# Patient Record
Sex: MALE | Race: WHITE | NOT HISPANIC OR LATINO
[De-identification: names, ages, dates, MRNs, and addresses within clinical notes are randomized per-mention and may not be internally consistent; named-entity substitution may affect disease eponyms.]

---

## 2019-07-10 PROBLEM — Z00.00 ENCOUNTER FOR PREVENTIVE HEALTH EXAMINATION: Status: ACTIVE | Noted: 2019-07-10

## 2019-07-15 ENCOUNTER — APPOINTMENT (OUTPATIENT)
Dept: OTOLARYNGOLOGY | Facility: CLINIC | Age: 49
End: 2019-07-15
Payer: MEDICARE

## 2019-07-15 VITALS
HEART RATE: 108 BPM | SYSTOLIC BLOOD PRESSURE: 104 MMHG | BODY MASS INDEX: 23.7 KG/M2 | DIASTOLIC BLOOD PRESSURE: 73 MMHG | WEIGHT: 175 LBS | HEIGHT: 72 IN

## 2019-07-15 DIAGNOSIS — R59.1 GENERALIZED ENLARGED LYMPH NODES: ICD-10-CM

## 2019-07-15 DIAGNOSIS — R22.0 LOCALIZED SWELLING, MASS AND LUMP, HEAD: ICD-10-CM

## 2019-07-15 DIAGNOSIS — R22.1 LOCALIZED SWELLING, MASS AND LUMP, HEAD: ICD-10-CM

## 2019-07-15 PROCEDURE — 99204 OFFICE O/P NEW MOD 45 MIN: CPT | Mod: 25

## 2019-07-15 PROCEDURE — 31575 DIAGNOSTIC LARYNGOSCOPY: CPT

## 2019-07-15 RX ORDER — GABAPENTIN 250 MG/5ML
300 SOLUTION ORAL
Refills: 0 | Status: ACTIVE | COMMUNITY

## 2019-07-15 RX ORDER — AMITRIPTYLINE HYDROCHLORIDE 10 MG/1
10 TABLET, FILM COATED ORAL
Refills: 0 | Status: ACTIVE | COMMUNITY

## 2019-07-15 RX ORDER — MORPHINE SULFATE 30 MG
30 TABLET, HYPODERMIC INJECTION
Refills: 0 | Status: ACTIVE | COMMUNITY

## 2019-07-15 NOTE — CONSULT LETTER
[FreeTextEntry2] : all call [FreeTextEntry1] : Dear Dr. SHASHI HIDALGO,\par \par Thank you for your kind referral. Please refer to my enclosed office notes for EDWARD HERREL . If there are any questions free to contact me.\par \par  [FreeTextEntry3] : Guy Tavares MD, FACS\par

## 2019-07-15 NOTE — REVIEW OF SYSTEMS
[Swelling Neck] : swelling neck [Swelling Face] : face swelling [Negative] : Heme/Lymph [Patient Intake Form Reviewed] : Patient intake form was reviewed [FreeTextEntry1] : strawberry birthmarks

## 2019-07-15 NOTE — REASON FOR VISIT
[Initial Consultation] : an initial consultation for [FreeTextEntry2] : lump in throat, changes in size

## 2019-07-15 NOTE — PROCEDURE
[de-identified] : Indication for procedure:Unable to examine laryngeal structures with mirror exam\par Scope # 4\par Topical anesthesia with viscous xylocaine 2% is applied to the anterior nares.\par A flexible fiberoptic laryngoscope is than introduced through the nares.\par The nasopharynx is clear without mass or inflammation.\par The posterior pharyngeal wall is unremarkable.\par The tongue base and vallecula are unremarkable.  The hypopharynx is unremarkable and unobstructed.\par The supraglottic larynx is within normal limits.\par Both vocal cords are fully mobile with no nodule, polyp or other lesion present.\par There is no edema or erythema overlying the arytenoid cartilages or the inter-arytenoid space.\par The subglottic space is clear.\par The voice has a normal quality.\par

## 2019-07-15 NOTE — ASSESSMENT
[FreeTextEntry1] : rt neck mass\par fluctuating in size by hx\par consistent w branchial cleft cyst\par ct neck

## 2019-07-15 NOTE — PHYSICAL EXAM
[Midline] : trachea located in midline position [Laryngoscopy Performed] : laryngoscopy was performed, see procedure section for findings [Normal] : no rashes [de-identified] : rt 4 cm soft discrete mass adjacent to rt scm andlateral to thyroid cartilage [de-identified] : trent cleared au

## 2019-07-15 NOTE — HISTORY OF PRESENT ILLNESS
[de-identified] : co lump neck noted several mo ago\par no pain\par to primary trial antibiotics, now fluctuating in size quite a bit  90% than relapse\par no hoarseness no dysphagia

## 2019-07-17 ENCOUNTER — APPOINTMENT (OUTPATIENT)
Dept: CT IMAGING | Facility: CLINIC | Age: 49
End: 2019-07-17

## 2019-07-17 ENCOUNTER — FORM ENCOUNTER (OUTPATIENT)
Age: 49
End: 2019-07-17

## 2019-07-18 ENCOUNTER — OUTPATIENT (OUTPATIENT)
Dept: OUTPATIENT SERVICES | Facility: HOSPITAL | Age: 49
LOS: 1 days | End: 2019-07-18
Payer: MEDICARE

## 2019-07-18 ENCOUNTER — APPOINTMENT (OUTPATIENT)
Dept: CT IMAGING | Facility: CLINIC | Age: 49
End: 2019-07-18

## 2019-07-18 ENCOUNTER — APPOINTMENT (OUTPATIENT)
Dept: CT IMAGING | Facility: CLINIC | Age: 49
End: 2019-07-18
Payer: MEDICARE

## 2019-07-18 DIAGNOSIS — Z98.1 ARTHRODESIS STATUS: Chronic | ICD-10-CM

## 2019-07-18 DIAGNOSIS — Z98.890 OTHER SPECIFIED POSTPROCEDURAL STATES: Chronic | ICD-10-CM

## 2019-07-18 DIAGNOSIS — Z00.8 ENCOUNTER FOR OTHER GENERAL EXAMINATION: ICD-10-CM

## 2019-07-18 PROCEDURE — 70491 CT SOFT TISSUE NECK W/DYE: CPT

## 2019-07-18 PROCEDURE — 70491 CT SOFT TISSUE NECK W/DYE: CPT | Mod: 26

## 2019-07-30 ENCOUNTER — TRANSCRIPTION ENCOUNTER (OUTPATIENT)
Age: 49
End: 2019-07-30

## 2019-08-09 ENCOUNTER — APPOINTMENT (OUTPATIENT)
Dept: OTOLARYNGOLOGY | Facility: CLINIC | Age: 49
End: 2019-08-09
Payer: MEDICARE

## 2019-08-09 VITALS
SYSTOLIC BLOOD PRESSURE: 112 MMHG | WEIGHT: 175 LBS | BODY MASS INDEX: 23.7 KG/M2 | DIASTOLIC BLOOD PRESSURE: 73 MMHG | HEART RATE: 91 BPM | HEIGHT: 72 IN

## 2019-08-09 DIAGNOSIS — R22.1 LOCALIZED SWELLING, MASS AND LUMP, NECK: ICD-10-CM

## 2019-08-09 PROCEDURE — 31575 DIAGNOSTIC LARYNGOSCOPY: CPT

## 2019-08-09 PROCEDURE — 99214 OFFICE O/P EST MOD 30 MIN: CPT | Mod: 25

## 2019-08-09 NOTE — CONSULT LETTER
[FreeTextEntry1] : Dear Dr. Tavares,\par I had the pleasure of evaluating your patient MAGGI COBIAN  thank you for allowing us to participate in their care. please see full note detailing our visit below.\par If you have any questions, please do not hesitate to call me and I would be happy to discuss further. \par \par Imtiaz Gomez M.D.\par Attending Physician,  \par Department of Otolaryngology - Head and Neck Surgery\par [facility] \par Office: [phone]\par Fax: [phone]\par

## 2019-08-09 NOTE — PHYSICAL EXAM
[Midline] : trachea located in midline position [Normal] : no rashes [de-identified] : 4 cm right cystic  mass lvl 2, just anterior to SCm

## 2019-08-09 NOTE — HISTORY OF PRESENT ILLNESS
[de-identified] : 47 y/o male referred by Dr. Tavares with 6-7 month history of non-tender mass in right side of neck which continually fluctuates in size- from very small (kidney bean) to very large. CT neck 7/18/19 showed cystic soft tissue mass within right juxta hyoid neck- type 2 branchial cleft cyst. Hasn't been treated be any abx since this happened.\par \par No throat pain. No difficulty swallowing or breathing. No vocal changes. \par No fever or chills.

## 2019-08-09 NOTE — ASSESSMENT
[FreeTextEntry1] : pt with right neck cyst possible  right branchial cleft cysts \par \par pt with likely likely right brancial cleft cysts large swelling, would like to consider removal with procedure possible Direct laryngosocpy. discussed risks and benefits - bleeding infection, hurst in voice, injury to cranial nerves including marginal mandibular that moves corner of the mouth due to its location, also sitting on great vessels and vagus, injury to larynx and pharynx, airway swelling and compromise, scar, injury to teeth, salivary fistular, numbness swallowing difficulty, recurrence as it has been infected and even with trying to excise all of track. Questions were asked to gauge level of understanding of the risk and patient was clear and their questions were answered. After complete discussion they elected to proceed with operation.\par \par \par I personally saw and examined MAGGI COBIAN in detail. I spoke to BREANNE Ferrer regarding the assessment and plan of care.  I preformed the procedures and I reviewed the above assessment and plan of care, and agree. I have made changes in changes in the body of the note where appropriate.\par \par

## 2019-08-09 NOTE — PROCEDURE
[de-identified] : Procedure performed: laryngeal Endoscopy- Diagnostic\par Pre-op/post op indication: dysphonia\par Verbal and/or written consent obtained from patient, Patient was unable to cooperate with mirror\par Scope #: 3, flexible fiber optic telescope used \par Scope was introduced through the nose passed on the floor of the nose to the nasopharynx and then followed down the soft palate to the lower pharynx. The tongue Base, Larynx, Hypopharynx were examined. Base of tongue was symmetric, vallecular was clear, epiglottis was not deformed, subglottis/ pyriform and posterior pharyngeal walls were clear. No erythema, edema, pooling of secretions, masses or lesions. Airway patent, no foreign body visualized. No glottic/supraglottic edema. True vocal cords, arytenoids, vestibular folds, ventricles, pyriform sinuses, and aryepiglottic folds appear normal bilaterally. Vocal cords mobile with good contact b/l.\par \par

## 2019-10-09 ENCOUNTER — INPATIENT (INPATIENT)
Facility: HOSPITAL | Age: 49
LOS: 1 days | Discharge: HOME | DRG: 603 | End: 2019-10-11
Attending: HOSPITALIST | Admitting: HOSPITALIST
Payer: MEDICARE

## 2019-10-09 VITALS — HEIGHT: 72 IN | WEIGHT: 175.05 LBS

## 2019-10-09 DIAGNOSIS — L03.129 ACUTE LYMPHANGITIS OF UNSPECIFIED PART OF LIMB: ICD-10-CM

## 2019-10-09 DIAGNOSIS — Z98.1 ARTHRODESIS STATUS: Chronic | ICD-10-CM

## 2019-10-09 DIAGNOSIS — Z98.890 OTHER SPECIFIED POSTPROCEDURAL STATES: Chronic | ICD-10-CM

## 2019-10-09 LAB
ALBUMIN SERPL ELPH-MCNC: 4 G/DL — SIGNIFICANT CHANGE UP (ref 3.3–5)
ALP SERPL-CCNC: 71 U/L — SIGNIFICANT CHANGE UP (ref 40–120)
ALT FLD-CCNC: 28 U/L — SIGNIFICANT CHANGE UP (ref 12–78)
ANION GAP SERPL CALC-SCNC: 2 MMOL/L — LOW (ref 5–17)
APPEARANCE UR: CLEAR — SIGNIFICANT CHANGE UP
APTT BLD: 34.6 SEC — SIGNIFICANT CHANGE UP (ref 27.5–36.3)
AST SERPL-CCNC: 18 U/L — SIGNIFICANT CHANGE UP (ref 15–37)
BASOPHILS # BLD AUTO: 0.04 K/UL — SIGNIFICANT CHANGE UP (ref 0–0.2)
BASOPHILS NFR BLD AUTO: 0.4 % — SIGNIFICANT CHANGE UP (ref 0–2)
BILIRUB SERPL-MCNC: 0.4 MG/DL — SIGNIFICANT CHANGE UP (ref 0.2–1.2)
BILIRUB UR-MCNC: NEGATIVE — SIGNIFICANT CHANGE UP
BUN SERPL-MCNC: 14 MG/DL — SIGNIFICANT CHANGE UP (ref 7–23)
CALCIUM SERPL-MCNC: 8.9 MG/DL — SIGNIFICANT CHANGE UP (ref 8.5–10.1)
CHLORIDE SERPL-SCNC: 106 MMOL/L — SIGNIFICANT CHANGE UP (ref 96–108)
CO2 SERPL-SCNC: 32 MMOL/L — HIGH (ref 22–31)
COLOR SPEC: YELLOW — SIGNIFICANT CHANGE UP
CREAT SERPL-MCNC: 1.03 MG/DL — SIGNIFICANT CHANGE UP (ref 0.5–1.3)
DIFF PNL FLD: NEGATIVE — SIGNIFICANT CHANGE UP
EOSINOPHIL # BLD AUTO: 0.23 K/UL — SIGNIFICANT CHANGE UP (ref 0–0.5)
EOSINOPHIL NFR BLD AUTO: 2.5 % — SIGNIFICANT CHANGE UP (ref 0–6)
GLUCOSE SERPL-MCNC: 90 MG/DL — SIGNIFICANT CHANGE UP (ref 70–99)
GLUCOSE UR QL: NEGATIVE MG/DL — SIGNIFICANT CHANGE UP
HCT VFR BLD CALC: 48.2 % — SIGNIFICANT CHANGE UP (ref 39–50)
HGB BLD-MCNC: 16 G/DL — SIGNIFICANT CHANGE UP (ref 13–17)
IMM GRANULOCYTES NFR BLD AUTO: 0.2 % — SIGNIFICANT CHANGE UP (ref 0–1.5)
INR BLD: 1.09 RATIO — SIGNIFICANT CHANGE UP (ref 0.88–1.16)
KETONES UR-MCNC: ABNORMAL
LACTATE SERPL-SCNC: 0.7 MMOL/L — SIGNIFICANT CHANGE UP (ref 0.7–2)
LEUKOCYTE ESTERASE UR-ACNC: ABNORMAL
LYMPHOCYTES # BLD AUTO: 2.58 K/UL — SIGNIFICANT CHANGE UP (ref 1–3.3)
LYMPHOCYTES # BLD AUTO: 28.3 % — SIGNIFICANT CHANGE UP (ref 13–44)
MCHC RBC-ENTMCNC: 31.1 PG — SIGNIFICANT CHANGE UP (ref 27–34)
MCHC RBC-ENTMCNC: 33.2 GM/DL — SIGNIFICANT CHANGE UP (ref 32–36)
MCV RBC AUTO: 93.8 FL — SIGNIFICANT CHANGE UP (ref 80–100)
MONOCYTES # BLD AUTO: 0.87 K/UL — SIGNIFICANT CHANGE UP (ref 0–0.9)
MONOCYTES NFR BLD AUTO: 9.5 % — SIGNIFICANT CHANGE UP (ref 2–14)
NEUTROPHILS # BLD AUTO: 5.38 K/UL — SIGNIFICANT CHANGE UP (ref 1.8–7.4)
NEUTROPHILS NFR BLD AUTO: 59.1 % — SIGNIFICANT CHANGE UP (ref 43–77)
NITRITE UR-MCNC: NEGATIVE — SIGNIFICANT CHANGE UP
PH UR: 5 — SIGNIFICANT CHANGE UP (ref 5–8)
PLATELET # BLD AUTO: 237 K/UL — SIGNIFICANT CHANGE UP (ref 150–400)
POTASSIUM SERPL-MCNC: 3.8 MMOL/L — SIGNIFICANT CHANGE UP (ref 3.5–5.3)
POTASSIUM SERPL-SCNC: 3.8 MMOL/L — SIGNIFICANT CHANGE UP (ref 3.5–5.3)
PROT SERPL-MCNC: 7.8 GM/DL — SIGNIFICANT CHANGE UP (ref 6–8.3)
PROT UR-MCNC: NEGATIVE MG/DL — SIGNIFICANT CHANGE UP
PROTHROM AB SERPL-ACNC: 12.1 SEC — SIGNIFICANT CHANGE UP (ref 10–12.9)
RBC # BLD: 5.14 M/UL — SIGNIFICANT CHANGE UP (ref 4.2–5.8)
RBC # FLD: 13.8 % — SIGNIFICANT CHANGE UP (ref 10.3–14.5)
SODIUM SERPL-SCNC: 140 MMOL/L — SIGNIFICANT CHANGE UP (ref 135–145)
SP GR SPEC: 1.02 — SIGNIFICANT CHANGE UP (ref 1.01–1.02)
UROBILINOGEN FLD QL: NEGATIVE MG/DL — SIGNIFICANT CHANGE UP
WBC # BLD: 9.12 K/UL — SIGNIFICANT CHANGE UP (ref 3.8–10.5)
WBC # FLD AUTO: 9.12 K/UL — SIGNIFICANT CHANGE UP (ref 3.8–10.5)

## 2019-10-09 PROCEDURE — 71045 X-RAY EXAM CHEST 1 VIEW: CPT | Mod: 26

## 2019-10-09 PROCEDURE — 73718 MRI LOWER EXTREMITY W/O DYE: CPT | Mod: LT

## 2019-10-09 PROCEDURE — 80202 ASSAY OF VANCOMYCIN: CPT

## 2019-10-09 PROCEDURE — 73718 MRI LOWER EXTREMITY W/O DYE: CPT | Mod: 26,LT

## 2019-10-09 PROCEDURE — 93971 EXTREMITY STUDY: CPT | Mod: 26,LT

## 2019-10-09 PROCEDURE — 73721 MRI JNT OF LWR EXTRE W/O DYE: CPT | Mod: 26,LT

## 2019-10-09 PROCEDURE — 81001 URINALYSIS AUTO W/SCOPE: CPT

## 2019-10-09 PROCEDURE — 36415 COLL VENOUS BLD VENIPUNCTURE: CPT

## 2019-10-09 PROCEDURE — 73630 X-RAY EXAM OF FOOT: CPT | Mod: 26,LT

## 2019-10-09 PROCEDURE — 73721 MRI JNT OF LWR EXTRE W/O DYE: CPT | Mod: LT

## 2019-10-09 PROCEDURE — 87086 URINE CULTURE/COLONY COUNT: CPT

## 2019-10-09 RX ORDER — AMPICILLIN SODIUM AND SULBACTAM SODIUM 250; 125 MG/ML; MG/ML
3 INJECTION, POWDER, FOR SUSPENSION INTRAMUSCULAR; INTRAVENOUS ONCE
Refills: 0 | Status: COMPLETED | OUTPATIENT
Start: 2019-10-09 | End: 2019-10-09

## 2019-10-09 RX ORDER — ENOXAPARIN SODIUM 100 MG/ML
40 INJECTION SUBCUTANEOUS DAILY
Refills: 0 | Status: DISCONTINUED | OUTPATIENT
Start: 2019-10-09 | End: 2019-10-11

## 2019-10-09 RX ORDER — CEFAZOLIN SODIUM 1 G
1000 VIAL (EA) INJECTION ONCE
Refills: 0 | Status: DISCONTINUED | OUTPATIENT
Start: 2019-10-09 | End: 2019-10-09

## 2019-10-09 RX ORDER — AMPICILLIN SODIUM AND SULBACTAM SODIUM 250; 125 MG/ML; MG/ML
3 INJECTION, POWDER, FOR SUSPENSION INTRAMUSCULAR; INTRAVENOUS EVERY 6 HOURS
Refills: 0 | Status: DISCONTINUED | OUTPATIENT
Start: 2019-10-09 | End: 2019-10-09

## 2019-10-09 RX ORDER — GABAPENTIN 400 MG/1
300 CAPSULE ORAL
Refills: 0 | Status: DISCONTINUED | OUTPATIENT
Start: 2019-10-09 | End: 2019-10-11

## 2019-10-09 RX ORDER — INFLUENZA VIRUS VACCINE 15; 15; 15; 15 UG/.5ML; UG/.5ML; UG/.5ML; UG/.5ML
0.5 SUSPENSION INTRAMUSCULAR ONCE
Refills: 0 | Status: COMPLETED | OUTPATIENT
Start: 2019-10-09 | End: 2019-10-09

## 2019-10-09 RX ORDER — CEFAZOLIN SODIUM 1 G
1000 VIAL (EA) INJECTION ONCE
Refills: 0 | Status: COMPLETED | OUTPATIENT
Start: 2019-10-09 | End: 2019-10-09

## 2019-10-09 RX ORDER — MORPHINE SULFATE 50 MG/1
15 CAPSULE, EXTENDED RELEASE ORAL EVERY 12 HOURS
Refills: 0 | Status: DISCONTINUED | OUTPATIENT
Start: 2019-10-09 | End: 2019-10-11

## 2019-10-09 RX ORDER — AMPICILLIN SODIUM AND SULBACTAM SODIUM 250; 125 MG/ML; MG/ML
INJECTION, POWDER, FOR SUSPENSION INTRAMUSCULAR; INTRAVENOUS
Refills: 0 | Status: DISCONTINUED | OUTPATIENT
Start: 2019-10-09 | End: 2019-10-09

## 2019-10-09 RX ORDER — SODIUM CHLORIDE 9 MG/ML
1000 INJECTION, SOLUTION INTRAVENOUS
Refills: 0 | Status: DISCONTINUED | OUTPATIENT
Start: 2019-10-09 | End: 2019-10-11

## 2019-10-09 RX ORDER — VANCOMYCIN HCL 1 G
1250 VIAL (EA) INTRAVENOUS ONCE
Refills: 0 | Status: COMPLETED | OUTPATIENT
Start: 2019-10-09 | End: 2019-10-09

## 2019-10-09 RX ORDER — MORPHINE SULFATE 50 MG/1
8 CAPSULE, EXTENDED RELEASE ORAL ONCE
Refills: 0 | Status: DISCONTINUED | OUTPATIENT
Start: 2019-10-09 | End: 2019-10-09

## 2019-10-09 RX ORDER — CEFTRIAXONE 500 MG/1
2000 INJECTION, POWDER, FOR SOLUTION INTRAMUSCULAR; INTRAVENOUS EVERY 24 HOURS
Refills: 0 | Status: DISCONTINUED | OUTPATIENT
Start: 2019-10-09 | End: 2019-10-11

## 2019-10-09 RX ORDER — VANCOMYCIN HCL 1 G
1000 VIAL (EA) INTRAVENOUS EVERY 12 HOURS
Refills: 0 | Status: DISCONTINUED | OUTPATIENT
Start: 2019-10-09 | End: 2019-10-11

## 2019-10-09 RX ORDER — MORPHINE SULFATE 50 MG/1
15 CAPSULE, EXTENDED RELEASE ORAL EVERY 12 HOURS
Refills: 0 | Status: DISCONTINUED | OUTPATIENT
Start: 2019-10-09 | End: 2019-10-09

## 2019-10-09 RX ORDER — MORPHINE SULFATE 50 MG/1
15 CAPSULE, EXTENDED RELEASE ORAL
Refills: 0 | Status: DISCONTINUED | OUTPATIENT
Start: 2019-10-09 | End: 2019-10-11

## 2019-10-09 RX ADMIN — Medication 166.67 MILLIGRAM(S): at 04:37

## 2019-10-09 RX ADMIN — Medication 1250 MILLIGRAM(S): at 06:07

## 2019-10-09 RX ADMIN — Medication 250 MILLIGRAM(S): at 17:44

## 2019-10-09 RX ADMIN — GABAPENTIN 300 MILLIGRAM(S): 400 CAPSULE ORAL at 17:44

## 2019-10-09 RX ADMIN — MORPHINE SULFATE 15 MILLIGRAM(S): 50 CAPSULE, EXTENDED RELEASE ORAL at 22:05

## 2019-10-09 RX ADMIN — MORPHINE SULFATE 8 MILLIGRAM(S): 50 CAPSULE, EXTENDED RELEASE ORAL at 05:45

## 2019-10-09 RX ADMIN — CEFTRIAXONE 2000 MILLIGRAM(S): 500 INJECTION, POWDER, FOR SOLUTION INTRAMUSCULAR; INTRAVENOUS at 15:55

## 2019-10-09 RX ADMIN — ENOXAPARIN SODIUM 40 MILLIGRAM(S): 100 INJECTION SUBCUTANEOUS at 14:42

## 2019-10-09 RX ADMIN — AMPICILLIN SODIUM AND SULBACTAM SODIUM 200 GRAM(S): 250; 125 INJECTION, POWDER, FOR SUSPENSION INTRAMUSCULAR; INTRAVENOUS at 14:31

## 2019-10-09 RX ADMIN — Medication 1000 MILLIGRAM(S): at 04:28

## 2019-10-09 RX ADMIN — MORPHINE SULFATE 15 MILLIGRAM(S): 50 CAPSULE, EXTENDED RELEASE ORAL at 17:44

## 2019-10-09 RX ADMIN — MORPHINE SULFATE 8 MILLIGRAM(S): 50 CAPSULE, EXTENDED RELEASE ORAL at 05:14

## 2019-10-09 RX ADMIN — MORPHINE SULFATE 15 MILLIGRAM(S): 50 CAPSULE, EXTENDED RELEASE ORAL at 22:35

## 2019-10-09 RX ADMIN — SODIUM CHLORIDE 75 MILLILITER(S): 9 INJECTION, SOLUTION INTRAVENOUS at 14:31

## 2019-10-09 NOTE — ED PROVIDER NOTE - CLINICAL SUMMARY MEDICAL DECISION MAKING FREE TEXT BOX
foot pain/swelling with lymphangitis, will check labs, US, xray, abx foot pain/swelling with lymphangitis, will check labs, US, xray, abx and admit for IV abx for lymphangitis

## 2019-10-09 NOTE — CONSULT NOTE ADULT - SUBJECTIVE AND OBJECTIVE BOX
Patient is a 48y old  Male who presents with a chief complaint of left foot pain, swelling     HPI:  47 y/o male with h/o low back pain was admitted on 10/8 for left foot pain. He developed pain located in left foot area x one day, constant in nature, aggravated by movement, partially relieved by not moving, non radiating, intensity 10/10 at maximum, associated with redness on same area which he noticed later which gradually getting worse. No fever, no trauma to the area. In ER he received unacyn.    Family history- father  of lung cancer (09 Oct 2019 08:10)      PMH: as above  PSH: as above  Meds: per reconciliation sheet, noted below  MEDICATIONS  (STANDING):  ampicillin/sulbactam  IVPB 3 Gram(s) IV Intermittent every 6 hours  ampicillin/sulbactam  IVPB      enoxaparin Injectable 40 milliGRAM(s) SubCutaneous daily  gabapentin 300 milliGRAM(s) Oral two times a day  lactated ringers. 1000 milliLiter(s) (75 mL/Hr) IV Continuous <Continuous>  morphine ER Tablet 15 milliGRAM(s) Oral two times a day    MEDICATIONS  (PRN):  morphine   Solution 15 milliGRAM(s) Oral every 12 hours PRN Severe Pain (7 - 10)    Allergies    Lyrica (Rash)  Naprosyn (Rash)  Valium (Hives)    Intolerances      Social: smoking 1/2 ppd x 25 y, no alcohol, no illegal drugs; no recent travel, no exposure to TB  FAMILY HISTORY:    no history of premature cardiovascular disease in first degree relatives  father  of lung cancer    ROS: the patient denies fever, no chills, no HA, no seizures, no dizziness, no sore throat, no nasal congestion, no blurry vision, no CP, no palpitations, no SOB, no cough, no abdominal pain, no diarrhea, no N/V, no dysuria, has left foot pain and rash, no joint aches, no rectal pain or bleeding, no night sweats  All other systems reviewed and are negative    Vital Signs Last 24 Hrs  T(C): 36.9 (09 Oct 2019 11:59), Max: 37.1 (09 Oct 2019 01:30)  T(F): 98.4 (09 Oct 2019 11:59), Max: 98.8 (09 Oct 2019 01:30)  HR: 75 (09 Oct 2019 11:59) (75 - 103)  BP: 105/58 (09 Oct 2019 11:59) (105/58 - 121/70)  BP(mean): --  RR: 17 (09 Oct 2019 11:59) (17 - 20)  SpO2: 95% (09 Oct 2019 11:59) (95% - 100%)  Daily Height in cm: 182.88 (09 Oct 2019 01:28)    Daily     PE:    Constitutional: frail looking  HEENT: NC/AT, EOMI, PERRLA, conjunctivae clear; ears and nose atraumatic; pharynx benign  Neck: supple; thyroid not palpable  Back: no tenderness  Respiratory: respiratory effort normal; clear to auscultation  Cardiovascular: S1S2 regular, no murmurs  Abdomen: soft, not tender, not distended, positive BS; no liver or spleen organomegaly  Genitourinary: no suprapubic tenderness  Lymphatic: no LN palpable  Musculoskeletal: no muscle tenderness, no joint swelling or tenderness  Extremities: no pedal edema  Left foot erythema, edema and warmth  Neurological/ Psychiatric: AxOx3, judgement and insight normal; moving all extremities  Skin: no rashes; no palpable lesions    Labs: all available labs reviewed                        16.0   9.12  )-----------( 237      ( 09 Oct 2019 04:16 )             48.2     10-    140  |  106  |  14  ----------------------------<  90  3.8   |  32<H>  |  1.03    Ca    8.9      09 Oct 2019 04:16    TPro  7.8  /  Alb  4.0  /  TBili  0.4  /  DBili  x   /  AST  18  /  ALT  28  /  AlkPhos  71  1009     LIVER FUNCTIONS - ( 09 Oct 2019 04:16 )  Alb: 4.0 g/dL / Pro: 7.8 gm/dL / ALK PHOS: 71 U/L / ALT: 28 U/L / AST: 18 U/L / GGT: x           Urinalysis Basic - ( 09 Oct 2019 06:47 )    Color: Yellow / Appearance: Clear / S.020 / pH: x  Gluc: x / Ketone: Trace  / Bili: Negative / Urobili: Negative mg/dL   Blood: x / Protein: Negative mg/dL / Nitrite: Negative   Leuk Esterase: Trace / RBC: Negative /HPF / WBC 0-2   Sq Epi: x / Non Sq Epi: Occasional / Bacteria: Negative      Radiology: all available radiological tests reviewed    Advanced directives addressed: full resuscitation

## 2019-10-09 NOTE — H&P ADULT - NSHPPHYSICALEXAM_GEN_ALL_CORE
Vital Signs Last 24 Hrs  T(C): 36.7 (09 Oct 2019 07:13), Max: 37.1 (09 Oct 2019 01:30)  T(F): 98.1 (09 Oct 2019 07:13), Max: 98.8 (09 Oct 2019 01:30)  HR: 87 (09 Oct 2019 07:13) (87 - 103)  BP: 114/58 (09 Oct 2019 07:13) (109/64 - 116/78)  BP(mean): --  RR: 19 (09 Oct 2019 07:13) (18 - 20)  SpO2: 100% (09 Oct 2019 07:13) (96% - 100%)    General: WN/WD NAD  Head- Atraumatic, normocephalic   Neurology: A&Ox3, nonfocal, CN II to XII intact, power intact 5/5 in all muscle group  HEENT- PERRLA, moist muscous membrane  Neck-supple, no JVD  Respiratory: Air entry equal b/l, CTA   CVS:  S1S2, no murmurs, rubs or gallops  Abdominal: Soft, NT, ND +BS,   Genitourinary- voiding, non palpable bladder  Extremities: left foot redness, tenderness, swelling noted   Skin- redness occupying left foot noted  Psychiatric- mood stable   LN- no lymphadenopathy

## 2019-10-09 NOTE — ED ADULT TRIAGE NOTE - CHIEF COMPLAINT QUOTE
patient presents to ed complaining of L foot pain / swelling, patient awoke yesterday morning and started experiencing the pain. No trauma to the site as per patient, denies fevers

## 2019-10-09 NOTE — H&P ADULT - HISTORY OF PRESENT ILLNESS
47 y/o male developed pain located in left foot area since one day, constant in nature, aggravated by movement, partially relieved by not moving, non radiating, intensity 10/10 at maximum, associated with redness on same area which he noticed later which gradually getting worse. no fever, no trauma to the area    Family history- father  of lung cancer

## 2019-10-09 NOTE — PATIENT PROFILE ADULT - BRADEN ACTIVITY
Quality 111:Pneumonia Vaccination Status For Older Adults: Pneumococcal Vaccination not Administered or Previously Received, Reason not Otherwise Specified
Detail Level: Generalized
Quality 431: Preventive Care And Screening: Unhealthy Alcohol Use - Screening: Patient screened for unhealthy alcohol use using a single question and scores less than 2 times per year
Quality 130: Documentation Of Current Medications In The Medical Record: Current Medications Documented
Quality 265: Biopsy Follow-Up: Biopsy results reviewed, communicated, tracked, and documented
(3) walks occasionally
Quality 226: Preventive Care And Screening: Tobacco Use: Screening And Cessation Intervention: Patient screened for tobacco and never smoked
Quality 128: Preventive Care And Screening: Body Mass Index (Bmi) Screening And Follow-Up Plan: BMI is documented within normal parameters and no follow-up plan is required.
Quality 110: Preventive Care And Screening: Influenza Immunization: Influenza immunization was not ordered or administered, reason not given

## 2019-10-09 NOTE — ED PROVIDER NOTE - ENMT, MLM
Airway patent, Nasal mucosa clear. Mouth with normal mucosa. Throat has no vesicles, no oropharyngeal exudates and uvula is midline.  Swelling to right neck.

## 2019-10-09 NOTE — ED PROVIDER NOTE - OBJECTIVE STATEMENT
49 yo male with h/o chronic back pain secondary to an injury, c/o left foot pain.  Patient noted yesterday some mild pain yesterday with increasing redness and pain and swelling today.  No injury/falls/trauma.  No fever.  Pain is predominately over the lateral 4/5th metatarsals, but stretches up the foot.  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

## 2019-10-09 NOTE — H&P ADULT - NSHPSOCIALHISTORY_GEN_ALL_CORE
smokes cigg about 1/2 pack since last 25 years- counselled not to smoke, offered nicotin pathc which he refused to take right now, no drinking, no recreational drug abuse

## 2019-10-09 NOTE — CONSULT NOTE ADULT - ASSESSMENT
47 y/o male with h/o low back pain was admitted on 10/8 for left foot pain. He developed pain located in left foot area x one day, constant in nature, aggravated by movement, partially relieved by not moving, non radiating, intensity 10/10 at maximum, associated with redness on same area which he noticed later which gradually getting worse. No fever, no trauma to the area. In ER he received unacyn.    1. Left foot cellulitis. 49 y/o male with h/o low back pain was admitted on 10/8 for left foot pain. He developed pain located in left foot area x one day, constant in nature, aggravated by movement, partially relieved by not moving, non radiating, intensity 10/10 at maximum, associated with redness on same area which he noticed later which gradually getting worse. No fever, no trauma to the area. In ER he received unacyn.    1. Left foot cellulitis.  -obtain BC x 2  -start vancomycin 1 gm IV q12h and ceftriaxone 2 gm IV qd  -reason for abx use and side effects reviewed with patient; monitor BMP and vancomycin trough levels   -elevate leg  -old chart reviewed to assess prior cultures  -monitor temps  -f/u CBC  -supportive care  2. Other issues:   -care per medicine

## 2019-10-09 NOTE — ED PROVIDER NOTE - CARE PLAN
Principal Discharge DX:	Cellulitis of foot Principal Discharge DX:	Cellulitis of foot  Secondary Diagnosis:	Lymphangitis, acute, lower leg

## 2019-10-09 NOTE — ED PROVIDER NOTE - SKIN, MLM
Left foot dorsal aspect swollen and tender with erythema and erythematous streaking up the anterior leg. Left foot dorsal aspect swollen and tender with erythema and multiple areas of erythematous streaking up the anterior leg.

## 2019-10-09 NOTE — H&P ADULT - NSHPLABSRESULTS_GEN_ALL_CORE
16.0   9.12  )-----------( 237      ( 09 Oct 2019 04:16 )             48.2     10-09    140  |  106  |  14  ----------------------------<  90  3.8   |  32<H>  |  1.03    Ca    8.9      09 Oct 2019 04:16    TPro  7.8  /  Alb  4.0  /  TBili  0.4  /  DBili  x   /  AST  18  /  ALT  28  /  AlkPhos  71  10-09        CAPILLARY BLOOD GLUCOSE        PT/INR - ( 09 Oct 2019 04:16 )   PT: 12.1 sec;   INR: 1.09 ratio         PTT - ( 09 Oct 2019 04:16 )  PTT:34.6 sec  Urinalysis Basic - ( 09 Oct 2019 06:47 )    Color: Yellow / Appearance: Clear / S.020 / pH: x  Gluc: x / Ketone: Trace  / Bili: Negative / Urobili: Negative mg/dL   Blood: x / Protein: Negative mg/dL / Nitrite: Negative   Leuk Esterase: Trace / RBC: Negative /HPF / WBC 0-2   Sq Epi: x / Non Sq Epi: Occasional / Bacteria: Negative        PT/INR - ( 09 Oct 2019 04:16 )   PT: 12.1 sec;   INR: 1.09 ratio         PTT - ( 09 Oct 2019 04:16 )  PTT:34.6 sec    #Chest xray -reviewed myself- no acute infiltrate noted

## 2019-10-09 NOTE — H&P ADULT - ASSESSMENT
A/P    #Left foot cellulitis   -etiology ?   -abx, pain control, iv fluids, blood culture to follow   -MRI to rule out any noninfectious etiology and have better understanding     #chronic back pain   -takes morphine ER 15 mg bid and Morphine IR 15 mg for breakthrough pain -for his back pain -managed as an outpt by his pain meds doctor   -resume his pain meds   -he also takes gabapentin     #DVT pr     #code status -discussed with pt -full code     #Above discussed with pt and all questions have been answered

## 2019-10-09 NOTE — H&P ADULT - NSHPREVIEWOFSYSTEMS_GEN_ALL_CORE
Review of Systems:  CONSTITUTIONAL: No weakness, fevers or chills  EYES/ENT: No visual changes;  No vertigo or throat pain   NECK: No pain or stiffness  RESPIRATORY: No cough, wheezing, hemoptysis; No shortness of breath,   CARDIOVASCULAR: No chest pain or palpitations  GASTROINTESTINAL: No abdominal or epigastric pain. No nausea, vomiting, or hematemesis; No diarrhea or constipation.   GENITOURINARY: No dysuria, frequency or hematuria  NEUROLOGICAL: No numbness or weakness  SKIN redness present on left foot   All other review of systems is negative unless indicated above

## 2019-10-10 LAB
CULTURE RESULTS: NO GROWTH — SIGNIFICANT CHANGE UP
SPECIMEN SOURCE: SIGNIFICANT CHANGE UP

## 2019-10-10 RX ADMIN — MORPHINE SULFATE 15 MILLIGRAM(S): 50 CAPSULE, EXTENDED RELEASE ORAL at 05:36

## 2019-10-10 RX ADMIN — MORPHINE SULFATE 15 MILLIGRAM(S): 50 CAPSULE, EXTENDED RELEASE ORAL at 17:07

## 2019-10-10 RX ADMIN — GABAPENTIN 300 MILLIGRAM(S): 400 CAPSULE ORAL at 05:34

## 2019-10-10 RX ADMIN — SODIUM CHLORIDE 75 MILLILITER(S): 9 INJECTION, SOLUTION INTRAVENOUS at 18:33

## 2019-10-10 RX ADMIN — MORPHINE SULFATE 15 MILLIGRAM(S): 50 CAPSULE, EXTENDED RELEASE ORAL at 05:34

## 2019-10-10 RX ADMIN — Medication 250 MILLIGRAM(S): at 17:06

## 2019-10-10 RX ADMIN — Medication 250 MILLIGRAM(S): at 06:08

## 2019-10-10 RX ADMIN — CEFTRIAXONE 2000 MILLIGRAM(S): 500 INJECTION, POWDER, FOR SOLUTION INTRAMUSCULAR; INTRAVENOUS at 13:18

## 2019-10-10 RX ADMIN — MORPHINE SULFATE 15 MILLIGRAM(S): 50 CAPSULE, EXTENDED RELEASE ORAL at 18:06

## 2019-10-10 RX ADMIN — MORPHINE SULFATE 15 MILLIGRAM(S): 50 CAPSULE, EXTENDED RELEASE ORAL at 22:31

## 2019-10-10 RX ADMIN — ENOXAPARIN SODIUM 40 MILLIGRAM(S): 100 INJECTION SUBCUTANEOUS at 13:18

## 2019-10-10 RX ADMIN — GABAPENTIN 300 MILLIGRAM(S): 400 CAPSULE ORAL at 17:07

## 2019-10-10 NOTE — PROGRESS NOTE ADULT - SUBJECTIVE AND OBJECTIVE BOX
Feels better erythema is decreasing, pain is less      Vital Signs Last 24 Hrs  T(C): 36.7 (10 Oct 2019 05:16), Max: 36.9 (09 Oct 2019 09:07)  T(F): 98.1 (10 Oct 2019 05:16), Max: 98.4 (09 Oct 2019 09:07)  HR: 67 (10 Oct 2019 05:16) (67 - 86)  BP: 107/56 (10 Oct 2019 05:16) (101/58 - 121/70)  BP(mean): --  RR: 18 (10 Oct 2019 05:16) (17 - 18)  SpO2: 95% (10 Oct 2019 05:16) (95% - 98%)    Physical Exam:      HEENT: NC/AT, EOMI, PERRLA, conjunctivae clear  Neck: supple; thyroid not palpable  Back: no tenderness  Respiratory: respiratory effort normal; clear to auscultation  Cardiovascular: S1S2 regular, no murmurs  Abdomen: soft, not tender, not distended, positive BS  Genitourinary: no suprapubic tenderness  Lymphatic: no LN palpable  Musculoskeletal: no muscle tenderness, no joint swelling or tenderness  Extremities: no pedal edema  Left foot erythema, edema and warmth - improving; no discharge  Neurological/ Psychiatric: AxOx3, moving all extremities  Skin: no rashes; no palpable lesions    Labs: reviewed                        16.0   9.12  )-----------( 237      ( 09 Oct 2019 04:16 )             48.2     10    140  |  106  |  14  ----------------------------<  90  3.8   |  32<H>  |  1.03    Ca    8.9      09 Oct 2019 04:16    TPro  7.8  /  Alb  4.0  /  TBili  0.4  /  DBili  x   /  AST  18  /  ALT  28  /  AlkPhos  71  10       LIVER FUNCTIONS - ( 09 Oct 2019 04:16 )  Alb: 4.0 g/dL / Pro: 7.8 gm/dL / ALK PHOS: 71 U/L / ALT: 28 U/L / AST: 18 U/L / GGT: x           Urinalysis Basic - ( 09 Oct 2019 06:47 )    Color: Yellow / Appearance: Clear / S.020 / pH: x  Gluc: x / Ketone: Trace  / Bili: Negative / Urobili: Negative mg/dL   Blood: x / Protein: Negative mg/dL / Nitrite: Negative   Leuk Esterase: Trace / RBC: Negative /HPF / WBC 0-2   Sq Epi: x / Non Sq Epi: Occasional / Bacteria: Negative      Culture - Urine (collected 09 Oct 2019 06:47)  Source: .Urine Clean Catch (Midstream)  Final Report (10 Oct 2019 07:32):    No growth      Radiology: all available radiological tests reviewed    < from: MR Foot No Cont, Left (10.09.19 @ 21:28) >  1. Strandy and confluent edema within the dorsal forefoot soft tissues,   which   could be due to trauma, cellulitis, or edema from vascular or systemic   etiologies. No discrete fluid collection identified.   2. No evidence of acute osseous injury.   3. Small area of fluid signal along the plantar aspect of the flexor   tendons to   the fourth digit and the fourth MTP joint, which could be related to   adventitious bursitis versus focal tenosynovitis.   4. Intermetatarsal bursitis of the second and third intermetatarsal   spaces.    < end of copied text >      Advanced directives addressed: full resuscitation

## 2019-10-10 NOTE — PROGRESS NOTE ADULT - ASSESSMENT
#Left foot cellulitis   -abx, pain control, iv fluids, blood culture to follow   -MRI noted    #chronic back pain   -takes morphine ER 15 mg bid and Morphine IR 15 mg for breakthrough pain -for his back pain -managed as an outpt by his pain meds doctor   -resume his pain meds   -he also takes gabapentin

## 2019-10-10 NOTE — PROGRESS NOTE ADULT - ASSESSMENT
49 y/o male with h/o low back pain was admitted on 10/8 for left foot pain. He developed pain located in left foot area x one day, constant in nature, aggravated by movement, partially relieved by not moving, non radiating, intensity 10/10 at maximum, associated with redness on same area which he noticed later which gradually getting worse. No fever, no trauma to the area. In ER he received unacyn.    1. Left foot cellulitis.  -f/u BC x 2  -on vancomycin 1 gm IV q12h and ceftriaxone 2 gm IV qd # 2  -tolerating abx well so far; no side effects noted  -obtain vancomycin trough level  -MRI reviewed; no collections noted   -elevate leg  -continue abx coverage  -monitor temps  -f/u CBC  -supportive care  2. Other issues:   -care per medicine

## 2019-10-10 NOTE — PROGRESS NOTE ADULT - SUBJECTIVE AND OBJECTIVE BOX
Date of service: 10-10-19 @ 08:14    Lying in bed in NAD  Left foot pain improving  Has left foot erythema and swelling    ROS: no fever or chills; denies dizziness, no HA, no SOB or cough, no abdominal pain, no diarrhea or constipation; no dysuria    MEDICATIONS  (STANDING):  cefTRIAXone Injectable. 2000 milliGRAM(s) IV Push every 24 hours  enoxaparin Injectable 40 milliGRAM(s) SubCutaneous daily  gabapentin 300 milliGRAM(s) Oral two times a day  lactated ringers. 1000 milliLiter(s) (75 mL/Hr) IV Continuous <Continuous>  morphine ER Tablet 15 milliGRAM(s) Oral two times a day  vancomycin  IVPB 1000 milliGRAM(s) IV Intermittent every 12 hours      Vital Signs Last 24 Hrs  T(C): 36.7 (10 Oct 2019 05:16), Max: 36.9 (09 Oct 2019 09:07)  T(F): 98.1 (10 Oct 2019 05:16), Max: 98.4 (09 Oct 2019 09:07)  HR: 67 (10 Oct 2019 05:16) (67 - 86)  BP: 107/56 (10 Oct 2019 05:16) (101/58 - 121/70)  BP(mean): --  RR: 18 (10 Oct 2019 05:16) (17 - 18)  SpO2: 95% (10 Oct 2019 05:16) (95% - 98%)    Physical Exam:    Constitutional: frail looking  HEENT: NC/AT, EOMI, PERRLA, conjunctivae clear  Neck: supple; thyroid not palpable  Back: no tenderness  Respiratory: respiratory effort normal; clear to auscultation  Cardiovascular: S1S2 regular, no murmurs  Abdomen: soft, not tender, not distended, positive BS  Genitourinary: no suprapubic tenderness  Lymphatic: no LN palpable  Musculoskeletal: no muscle tenderness, no joint swelling or tenderness  Extremities: no pedal edema  Left foot erythema, edema and warmth - improving; no discharge  Neurological/ Psychiatric: AxOx3, moving all extremities  Skin: no rashes; no palpable lesions    Labs: reviewed                        16.0   9.12  )-----------( 237      ( 09 Oct 2019 04:16 )             48.2     10-09    140  |  106  |  14  ----------------------------<  90  3.8   |  32<H>  |  1.03    Ca    8.9      09 Oct 2019 04:16    TPro  7.8  /  Alb  4.0  /  TBili  0.4  /  DBili  x   /  AST  18  /  ALT  28  /  AlkPhos  71  10-09       LIVER FUNCTIONS - ( 09 Oct 2019 04:16 )  Alb: 4.0 g/dL / Pro: 7.8 gm/dL / ALK PHOS: 71 U/L / ALT: 28 U/L / AST: 18 U/L / GGT: x           Urinalysis Basic - ( 09 Oct 2019 06:47 )    Color: Yellow / Appearance: Clear / S.020 / pH: x  Gluc: x / Ketone: Trace  / Bili: Negative / Urobili: Negative mg/dL   Blood: x / Protein: Negative mg/dL / Nitrite: Negative   Leuk Esterase: Trace / RBC: Negative /HPF / WBC 0-2   Sq Epi: x / Non Sq Epi: Occasional / Bacteria: Negative      Culture - Urine (collected 09 Oct 2019 06:47)  Source: .Urine Clean Catch (Midstream)  Final Report (10 Oct 2019 07:32):    No growth      Radiology: all available radiological tests reviewed    < from: MR Foot No Cont, Left (10.09.19 @ 21:28) >  1. Strandy and confluent edema within the dorsal forefoot soft tissues,   which   could be due to trauma, cellulitis, or edema from vascular or systemic   etiologies. No discrete fluid collection identified.   2. No evidence of acute osseous injury.   3. Small area of fluid signal along the plantar aspect of the flexor   tendons to   the fourth digit and the fourth MTP joint, which could be related to   adventitious bursitis versus focal tenosynovitis.   4. Intermetatarsal bursitis of the second and third intermetatarsal   spaces.    < end of copied text >      Advanced directives addressed: full resuscitation

## 2019-10-11 ENCOUNTER — TRANSCRIPTION ENCOUNTER (OUTPATIENT)
Age: 49
End: 2019-10-11

## 2019-10-11 VITALS
DIASTOLIC BLOOD PRESSURE: 61 MMHG | SYSTOLIC BLOOD PRESSURE: 102 MMHG | RESPIRATION RATE: 18 BRPM | HEART RATE: 70 BPM | TEMPERATURE: 98 F | OXYGEN SATURATION: 96 %

## 2019-10-11 LAB — VANCOMYCIN TROUGH SERPL-MCNC: 7.7 UG/ML — LOW (ref 10–20)

## 2019-10-11 RX ORDER — AZTREONAM 2 G
160 VIAL (EA) INJECTION
Qty: 3200 | Refills: 0
Start: 2019-10-11 | End: 2019-10-20

## 2019-10-11 RX ADMIN — MORPHINE SULFATE 15 MILLIGRAM(S): 50 CAPSULE, EXTENDED RELEASE ORAL at 05:20

## 2019-10-11 RX ADMIN — Medication 250 MILLIGRAM(S): at 05:41

## 2019-10-11 RX ADMIN — GABAPENTIN 300 MILLIGRAM(S): 400 CAPSULE ORAL at 05:21

## 2019-10-11 NOTE — DISCHARGE NOTE NURSING/CASE MANAGEMENT/SOCIAL WORK - PATIENT PORTAL LINK FT
You can access the FollowMyHealth Patient Portal offered by [facility] by registering at the following website: [email or web address]. By joining [email or web address]’s FollowMyHealth portal, you will also be able to view your health information using other applications (apps) compatible with our system.

## 2019-10-11 NOTE — DISCHARGE NOTE PROVIDER - CARE PROVIDER_API CALL
Regina Cole)  Family Medicine  [address] [place] [address]  Phone: [phone]  Fax: [phone]  Follow Up Time:

## 2019-10-11 NOTE — DISCHARGE NOTE PROVIDER - NSDCCPCAREPLAN_GEN_ALL_CORE_FT
PRINCIPAL DISCHARGE DIAGNOSIS  Diagnosis: Cellulitis of foot  Assessment and Plan of Treatment: antibiotic and f/up with PCP pls take the copy of the MRI to your PCP      SECONDARY DISCHARGE DIAGNOSES  Diagnosis: Lymphangitis, acute, lower leg  Assessment and Plan of Treatment:

## 2019-10-11 NOTE — DISCHARGE NOTE PROVIDER - NSDCFUSCHEDAPPT_GEN_ALL_CORE_FT
MAGGI COBIAN ; 12/11/2019 ; NPP Otolaryng [place] [address] MAGGI Gee ; 12/19/2019 ; NPP Otolaryng [facility]

## 2019-10-11 NOTE — PROGRESS NOTE ADULT - ASSESSMENT
47 y/o male with h/o low back pain was admitted on 10/8 for left foot pain. He developed pain located in left foot area x one day, constant in nature, aggravated by movement, partially relieved by not moving, non radiating, intensity 10/10 at maximum, associated with redness on same area which he noticed later which gradually getting worse. No fever, no trauma to the area. In ER he received unacyn.    1. Left foot cellulitis.  -erythema and edema are improving  -f/u BC x 2  -on vancomycin 1 gm IV q12h and ceftriaxone 2 gm IV qd # 3  -tolerating abx well so far; no side effects noted  -vancomycin trough level is low  -MRI reviewed; no collections noted; no OM  -elevate leg  -may change abx to bactrim DS 1 tab PO q12h for 10 more days  -monitor temps  -f/u CBC  -supportive care  2. Other issues:   -care per medicine

## 2019-10-11 NOTE — PROGRESS NOTE ADULT - SUBJECTIVE AND OBJECTIVE BOX
Date of service: 10-11-19 @ 08:42    Lying in bed in NAD  Left foot pain is improving  Still with dorsal aspect erythema    ROS: no fever or chills; denies dizziness, no HA, no SOB or cough, no abdominal pain, no diarrhea or constipation; no dysuria    MEDICATIONS  (STANDING):  cefTRIAXone Injectable. 2000 milliGRAM(s) IV Push every 24 hours  enoxaparin Injectable 40 milliGRAM(s) SubCutaneous daily  gabapentin 300 milliGRAM(s) Oral two times a day  lactated ringers. 1000 milliLiter(s) (75 mL/Hr) IV Continuous <Continuous>  morphine ER Tablet 15 milliGRAM(s) Oral two times a day  vancomycin  IVPB 1000 milliGRAM(s) IV Intermittent every 12 hours      Vital Signs Last 24 Hrs  T(C): 36.4 (11 Oct 2019 05:22), Max: 36.9 (10 Oct 2019 20:54)  T(F): 97.5 (11 Oct 2019 05:22), Max: 98.4 (10 Oct 2019 20:54)  HR: 70 (11 Oct 2019 05:22) (70 - 90)  BP: 102/61 (11 Oct 2019 05:22) (102/61 - 120/61)  BP(mean): --  RR: 18 (11 Oct 2019 05:22) (17 - 18)  SpO2: 96% (11 Oct 2019 05:22) (96% - 98%)    Physical Exam:      Constitutional: frail looking  HEENT: NC/AT, EOMI, PERRLA, conjunctivae clear  Neck: supple; thyroid not palpable  Back: no tenderness  Respiratory: respiratory effort normal; clear to auscultation  Cardiovascular: S1S2 regular, no murmurs  Abdomen: soft, not tender, not distended, positive BS  Genitourinary: no suprapubic tenderness  Lymphatic: no LN palpable  Musculoskeletal: no muscle tenderness, no joint swelling or tenderness  Extremities: no pedal edema  Left foot erythema, edema and warmth - improving; no discharge  Neurological/ Psychiatric: AxOx3, moving all extremities  Skin: no rashes; no palpable lesions    Labs: reviewed    Vancomycin Level, Trough: 7.7 ug/mL (10-11 @ 05:35)                          16.0   9.12  )-----------( 237      ( 09 Oct 2019 04:16 )             48.2     10-09    140  |  106  |  14  ----------------------------<  90  3.8   |  32<H>  |  1.03    Ca    8.9      09 Oct 2019 04:16    TPro  7.8  /  Alb  4.0  /  TBili  0.4  /  DBili  x   /  AST  18  /  ALT  28  /  AlkPhos  71  10-09       LIVER FUNCTIONS - ( 09 Oct 2019 04:16 )  Alb: 4.0 g/dL / Pro: 7.8 gm/dL / ALK PHOS: 71 U/L / ALT: 28 U/L / AST: 18 U/L / GGT: x           Urinalysis Basic - ( 09 Oct 2019 06:47 )    Color: Yellow / Appearance: Clear / S.020 / pH: x  Gluc: x / Ketone: Trace  / Bili: Negative / Urobili: Negative mg/dL   Blood: x / Protein: Negative mg/dL / Nitrite: Negative   Leuk Esterase: Trace / RBC: Negative /HPF / WBC 0-2   Sq Epi: x / Non Sq Epi: Occasional / Bacteria: Negative      Culture - Urine (collected 09 Oct 2019 06:47)  Source: .Urine Clean Catch (Midstream)  Final Report (10 Oct 2019 07:32):    No growth      Radiology: all available radiological tests reviewed    < from: MR Foot No Cont, Left (10.09.19 @ 21:28) >  1. Strandy and confluent edema within the dorsal forefoot soft tissues,   which   could be due to trauma, cellulitis, or edema from vascular or systemic   etiologies. No discrete fluid collection identified.   2. No evidence of acute osseous injury.   3. Small area of fluid signal along the plantar aspect of the flexor   tendons to   the fourth digit and the fourth MTP joint, which could be related to   adventitious bursitis versus focal tenosynovitis.   4. Intermetatarsal bursitis of the second and third intermetatarsal   spaces.    < end of copied text >      Advanced directives addressed: full resuscitation

## 2019-10-11 NOTE — DISCHARGE NOTE PROVIDER - HOSPITAL COURSE
HEENT: NC/AT, EOMI, PERRLA, conjunctivae clear    Neck: supple; thyroid not palpable    Back: no tenderness    Respiratory: respiratory effort normal; clear to auscultation    Cardiovascular: S1S2 regular, no murmurs    Abdomen: soft, not tender, not distended, positive BS    Genitourinary: no suprapubic tenderness    Lymphatic: no LN palpable    Musculoskeletal: no muscle tenderness, no joint swelling or tenderness    Extremities: no pedal edema    Left foot erythema, edema and warmth - improving; no discharge    Neurological/ Psychiatric: AxOx3, moving all extremities    Skin: no rashes; no palpable lesions        Labs: reviewed                                           Advanced directives addressed: full resuscitation        Assessment and Plan:     · Assessment	    #Left foot cellulitis     -abx, pain control, iv fluids, blood culture to follow     -MRI noted will take copy with him for PCP to f/up        #chronic back pain     -takes morphine ER 15 mg bid and Morphine IR 15 mg for breakthrough pain -for his back pain -managed as an outpt by his pain meds doctor     -resume his pain meds     -he also takes gabapentin

## 2019-10-14 LAB
CULTURE RESULTS: SIGNIFICANT CHANGE UP
CULTURE RESULTS: SIGNIFICANT CHANGE UP
SPECIMEN SOURCE: SIGNIFICANT CHANGE UP
SPECIMEN SOURCE: SIGNIFICANT CHANGE UP

## 2019-10-15 DIAGNOSIS — L03.116 CELLULITIS OF LEFT LOWER LIMB: ICD-10-CM

## 2019-10-15 DIAGNOSIS — F17.210 NICOTINE DEPENDENCE, CIGARETTES, UNCOMPLICATED: ICD-10-CM

## 2019-10-15 DIAGNOSIS — L03.126: ICD-10-CM

## 2019-10-15 DIAGNOSIS — G89.29 OTHER CHRONIC PAIN: ICD-10-CM

## 2019-10-15 DIAGNOSIS — M54.9 DORSALGIA, UNSPECIFIED: ICD-10-CM

## 2019-10-15 DIAGNOSIS — Z88.8 ALLERGY STATUS TO OTHER DRUGS, MEDICAMENTS AND BIOLOGICAL SUBSTANCES STATUS: ICD-10-CM

## 2019-11-15 ENCOUNTER — TRANSCRIPTION ENCOUNTER (OUTPATIENT)
Age: 49
End: 2019-11-15

## 2019-11-23 PROBLEM — M54.9 DORSALGIA, UNSPECIFIED: Chronic | Status: ACTIVE | Noted: 2019-10-09

## 2019-11-29 ENCOUNTER — OUTPATIENT (OUTPATIENT)
Dept: OUTPATIENT SERVICES | Facility: HOSPITAL | Age: 49
LOS: 1 days | End: 2019-11-29
Payer: MEDICARE

## 2019-11-29 VITALS
TEMPERATURE: 98 F | HEART RATE: 80 BPM | WEIGHT: 179.9 LBS | OXYGEN SATURATION: 99 % | RESPIRATION RATE: 16 BRPM | SYSTOLIC BLOOD PRESSURE: 119 MMHG | DIASTOLIC BLOOD PRESSURE: 78 MMHG | HEIGHT: 72 IN

## 2019-11-29 DIAGNOSIS — L03.116 CELLULITIS OF LEFT LOWER LIMB: ICD-10-CM

## 2019-11-29 DIAGNOSIS — Q18.0 SINUS, FISTULA AND CYST OF BRANCHIAL CLEFT: ICD-10-CM

## 2019-11-29 DIAGNOSIS — Z94.5 SKIN TRANSPLANT STATUS: Chronic | ICD-10-CM

## 2019-11-29 DIAGNOSIS — Z01.818 ENCOUNTER FOR OTHER PREPROCEDURAL EXAMINATION: ICD-10-CM

## 2019-11-29 DIAGNOSIS — Z98.890 OTHER SPECIFIED POSTPROCEDURAL STATES: Chronic | ICD-10-CM

## 2019-11-29 DIAGNOSIS — Z98.1 ARTHRODESIS STATUS: Chronic | ICD-10-CM

## 2019-11-29 LAB
ANION GAP SERPL CALC-SCNC: 18 MMOL/L — HIGH (ref 5–17)
BUN SERPL-MCNC: 13 MG/DL — SIGNIFICANT CHANGE UP (ref 7–23)
CALCIUM SERPL-MCNC: 9.8 MG/DL — SIGNIFICANT CHANGE UP (ref 8.4–10.5)
CHLORIDE SERPL-SCNC: 97 MMOL/L — SIGNIFICANT CHANGE UP (ref 96–108)
CO2 SERPL-SCNC: 25 MMOL/L — SIGNIFICANT CHANGE UP (ref 22–31)
CREAT SERPL-MCNC: 0.99 MG/DL — SIGNIFICANT CHANGE UP (ref 0.5–1.3)
GLUCOSE SERPL-MCNC: 83 MG/DL — SIGNIFICANT CHANGE UP (ref 70–99)
HCT VFR BLD CALC: 49.6 % — SIGNIFICANT CHANGE UP (ref 39–50)
HGB BLD-MCNC: 16.2 G/DL — SIGNIFICANT CHANGE UP (ref 13–17)
MCHC RBC-ENTMCNC: 31.3 PG — SIGNIFICANT CHANGE UP (ref 27–34)
MCHC RBC-ENTMCNC: 32.7 GM/DL — SIGNIFICANT CHANGE UP (ref 32–36)
MCV RBC AUTO: 95.8 FL — SIGNIFICANT CHANGE UP (ref 80–100)
PLATELET # BLD AUTO: 329 K/UL — SIGNIFICANT CHANGE UP (ref 150–400)
POTASSIUM SERPL-MCNC: 3.9 MMOL/L — SIGNIFICANT CHANGE UP (ref 3.5–5.3)
POTASSIUM SERPL-SCNC: 3.9 MMOL/L — SIGNIFICANT CHANGE UP (ref 3.5–5.3)
RBC # BLD: 5.18 M/UL — SIGNIFICANT CHANGE UP (ref 4.2–5.8)
RBC # FLD: 13.9 % — SIGNIFICANT CHANGE UP (ref 10.3–14.5)
SODIUM SERPL-SCNC: 140 MMOL/L — SIGNIFICANT CHANGE UP (ref 135–145)
WBC # BLD: 6.72 K/UL — SIGNIFICANT CHANGE UP (ref 3.8–10.5)
WBC # FLD AUTO: 6.72 K/UL — SIGNIFICANT CHANGE UP (ref 3.8–10.5)

## 2019-11-29 PROCEDURE — 85027 COMPLETE CBC AUTOMATED: CPT

## 2019-11-29 PROCEDURE — 80048 BASIC METABOLIC PNL TOTAL CA: CPT

## 2019-11-29 PROCEDURE — G0463: CPT

## 2019-11-29 RX ORDER — AMITRIPTYLINE HCL 25 MG
300 TABLET ORAL
Qty: 0 | Refills: 0 | DISCHARGE

## 2019-11-29 RX ORDER — CEFAZOLIN SODIUM 1 G
2000 VIAL (EA) INJECTION ONCE
Refills: 0 | Status: DISCONTINUED | OUTPATIENT
Start: 2019-12-11 | End: 2020-01-05

## 2019-11-29 RX ORDER — MORPHINE SULFATE 50 MG/1
15 CAPSULE, EXTENDED RELEASE ORAL
Qty: 0 | Refills: 0 | DISCHARGE

## 2019-11-29 RX ORDER — MORPHINE SULFATE 50 MG/1
1 CAPSULE, EXTENDED RELEASE ORAL
Qty: 0 | Refills: 0 | DISCHARGE

## 2019-11-29 NOTE — H&P PST ADULT - NSICDXPASTMEDICALHX_GEN_ALL_CORE_FT
PAST MEDICAL HISTORY:  Cellulitis left foot, on Bactrim    Chronic back pain     Compression fracture of vertebra lumbar 2008, on disability since    Cyst of branchial cleft

## 2019-11-29 NOTE — H&P PST ADULT - NSICDXPROBLEM_GEN_ALL_CORE_FT
PROBLEM DIAGNOSES  Problem: Cyst of branchial cleft  Assessment and Plan: planned for excision of right brachial cleft cyst/neck mass, direct laryngoscopy on 12/11/19.   PSt labs send  preprocedure surgical scrub instructions discussed  continue pain managmenet     Problem: Cellulitis of foot, left  Assessment and Plan: ID follow up 12/9/19   continue abx PROBLEM DIAGNOSES  Problem: Cyst of branchial cleft  Assessment and Plan: planned for excision of right brachial cleft cyst/neck mass, direct laryngoscopy on 12/11/19.   PSt labs send  preprocedure surgical scrub instructions discussed  continue pain management     Problem: Cellulitis of foot, left  Assessment and Plan: ID follow up 12/9/19   continue abx

## 2019-11-29 NOTE — H&P PST ADULT - HISTORY OF PRESENT ILLNESS
47 y/o male with PMH of left foot recent cellultis on Bactrim since 10/9/19 followed by ID  excision of right brachial cleft cyst/neck mass, direct laryngoscopy on 12/11/19. 49 y/o male with PMH of chronic lower back pain, left foot recent cellulitis on Bactrim since 10/9/19 followed by ID with right neck mass planned for excision of right brachial cleft cyst/neck mass, direct laryngoscopy on 12/11/19. 47 y/o male with PMH of chronic lower back pain, left foot recent cellulitis with [place] hospitalization on Bactrim since 10/9/19 followed by ID with right neck mass planned for excision of right brachial cleft cyst/neck mass, direct laryngoscopy on 12/11/19.

## 2019-11-29 NOTE — H&P PST ADULT - OTHER CARE PROVIDERS
Dr. BLUE ID 12/9/19 [id number], Dr. Tiffany Pinto Pain management/ workers comp Dr. BLUE ID (12/9/19 [id number]), Dr. Tiffany Pinto Pain management/ workers comp
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
## 2019-12-10 ENCOUNTER — TRANSCRIPTION ENCOUNTER (OUTPATIENT)
Age: 49
End: 2019-12-10

## 2019-12-11 ENCOUNTER — RESULT REVIEW (OUTPATIENT)
Age: 49
End: 2019-12-11

## 2019-12-11 ENCOUNTER — APPOINTMENT (OUTPATIENT)
Dept: OTOLARYNGOLOGY | Facility: HOSPITAL | Age: 49
End: 2019-12-11

## 2019-12-11 ENCOUNTER — OUTPATIENT (OUTPATIENT)
Dept: OUTPATIENT SERVICES | Facility: HOSPITAL | Age: 49
LOS: 1 days | End: 2019-12-11
Payer: MEDICARE

## 2019-12-11 VITALS
WEIGHT: 179.9 LBS | HEIGHT: 72 IN | OXYGEN SATURATION: 98 % | HEART RATE: 102 BPM | DIASTOLIC BLOOD PRESSURE: 74 MMHG | RESPIRATION RATE: 16 BRPM | TEMPERATURE: 98 F | SYSTOLIC BLOOD PRESSURE: 112 MMHG

## 2019-12-11 VITALS
SYSTOLIC BLOOD PRESSURE: 107 MMHG | DIASTOLIC BLOOD PRESSURE: 56 MMHG | RESPIRATION RATE: 16 BRPM | OXYGEN SATURATION: 98 % | HEART RATE: 75 BPM

## 2019-12-11 DIAGNOSIS — Z98.890 OTHER SPECIFIED POSTPROCEDURAL STATES: Chronic | ICD-10-CM

## 2019-12-11 DIAGNOSIS — Z01.818 ENCOUNTER FOR OTHER PREPROCEDURAL EXAMINATION: ICD-10-CM

## 2019-12-11 DIAGNOSIS — Z94.5 SKIN TRANSPLANT STATUS: Chronic | ICD-10-CM

## 2019-12-11 DIAGNOSIS — Q18.0 SINUS, FISTULA AND CYST OF BRANCHIAL CLEFT: ICD-10-CM

## 2019-12-11 DIAGNOSIS — Z98.1 ARTHRODESIS STATUS: Chronic | ICD-10-CM

## 2019-12-11 PROCEDURE — 88305 TISSUE EXAM BY PATHOLOGIST: CPT | Mod: 26

## 2019-12-11 PROCEDURE — 42815 EXCISION OF NECK CYST: CPT | Mod: RT

## 2019-12-11 PROCEDURE — C1889: CPT

## 2019-12-11 PROCEDURE — 88305 TISSUE EXAM BY PATHOLOGIST: CPT

## 2019-12-11 RX ORDER — GABAPENTIN 400 MG/1
1 CAPSULE ORAL
Qty: 0 | Refills: 0 | DISCHARGE

## 2019-12-11 RX ORDER — MORPHINE SULFATE 50 MG/1
1 CAPSULE, EXTENDED RELEASE ORAL
Qty: 0 | Refills: 0 | DISCHARGE

## 2019-12-11 RX ORDER — MORPHINE SULFATE 50 MG/1
30 CAPSULE, EXTENDED RELEASE ORAL
Qty: 0 | Refills: 0 | DISCHARGE

## 2019-12-11 RX ORDER — AMOXICILLIN AND CLAVULANATE POTASSIUM 875; 125 MG/1; MG/1
875-125 TABLET, COATED ORAL
Qty: 20 | Refills: 0 | Status: ACTIVE | COMMUNITY
Start: 2019-12-11 | End: 1900-01-01

## 2019-12-11 RX ORDER — CHLORHEXIDINE GLUCONATE 213 G/1000ML
1 SOLUTION TOPICAL ONCE
Refills: 0 | Status: COMPLETED | OUTPATIENT
Start: 2019-12-11 | End: 2019-12-11

## 2019-12-11 RX ORDER — SODIUM CHLORIDE 9 MG/ML
3 INJECTION INTRAMUSCULAR; INTRAVENOUS; SUBCUTANEOUS EVERY 8 HOURS
Refills: 0 | Status: DISCONTINUED | OUTPATIENT
Start: 2019-12-11 | End: 2019-12-11

## 2019-12-11 RX ORDER — LIDOCAINE HCL 20 MG/ML
0.2 VIAL (ML) INJECTION ONCE
Refills: 0 | Status: DISCONTINUED | OUTPATIENT
Start: 2019-12-11 | End: 2019-12-11

## 2019-12-11 RX ORDER — MORPHINE SULFATE 50 MG/1
4 CAPSULE, EXTENDED RELEASE ORAL
Refills: 0 | Status: DISCONTINUED | OUTPATIENT
Start: 2019-12-11 | End: 2019-12-11

## 2019-12-11 RX ORDER — ONDANSETRON 8 MG/1
8 TABLET, FILM COATED ORAL ONCE
Refills: 0 | Status: DISCONTINUED | OUTPATIENT
Start: 2019-12-11 | End: 2019-12-11

## 2019-12-11 RX ORDER — AMITRIPTYLINE HCL 25 MG
100 TABLET ORAL
Qty: 0 | Refills: 0 | DISCHARGE

## 2019-12-11 RX ORDER — OXYCODONE AND ACETAMINOPHEN 5; 325 MG/1; MG/1
5-325 TABLET ORAL
Qty: 20 | Refills: 0 | Status: ACTIVE | COMMUNITY
Start: 2019-12-11 | End: 1900-01-01

## 2019-12-11 RX ADMIN — Medication 1 TABLET(S): at 19:09

## 2019-12-11 RX ADMIN — SODIUM CHLORIDE 3 MILLILITER(S): 9 INJECTION INTRAMUSCULAR; INTRAVENOUS; SUBCUTANEOUS at 12:01

## 2019-12-11 RX ADMIN — MORPHINE SULFATE 4 MILLIGRAM(S): 50 CAPSULE, EXTENDED RELEASE ORAL at 18:20

## 2019-12-11 RX ADMIN — CHLORHEXIDINE GLUCONATE 1 APPLICATION(S): 213 SOLUTION TOPICAL at 12:01

## 2019-12-11 RX ADMIN — MORPHINE SULFATE 4 MILLIGRAM(S): 50 CAPSULE, EXTENDED RELEASE ORAL at 18:10

## 2019-12-11 NOTE — PRE-ANESTHESIA EVALUATION ADULT - NSANTHPMHFT_GEN_ALL_CORE
Over past few months has slowly been increasing in size.  Denies any dysphagia/achalasia/dysphonia/stridor/dyspnea.  No change when supine or standing.    Infectious Disease clearance in chart regarding cellulitis of left foot

## 2019-12-11 NOTE — ASU DISCHARGE PLAN (ADULT/PEDIATRIC) - ASU DC SPECIAL INSTRUCTIONSFT
Discharge Instructions:  1. Keep wound dry until clinical follow-up. The large dressing will be removed in 1 day. There is a penrose drain that will also be removed in 1 day.  2. No heavy lifting. Avoid bending down. Sleep with head of bed elevated.  3. Take Tylenol for pain control as needed.

## 2019-12-11 NOTE — ASU DISCHARGE PLAN (ADULT/PEDIATRIC) - PROVIDER TOKENS
PROVIDER:[[id number]],SCHEDULEDAPPT:[12/12/2019],SCHEDULEDAPPTTIME:[09:45 AM],ESTABLISHEDPATIENT:[T]] PROVIDER:[[id number]],FOLLOWUP:[1 week],SCHEDULEDAPPTTIME:[09:45 AM],ESTABLISHEDPATIENT:[T]],PROVIDER:[[id number]],SCHEDULEDAPPT:[12/12/2019],SCHEDULEDAPPTTIME:[10:00 AM],ESTABLISHEDPATIENT:[T]]

## 2019-12-11 NOTE — ASU DISCHARGE PLAN (ADULT/PEDIATRIC) - CALL YOUR DOCTOR IF YOU HAVE ANY OF THE FOLLOWING:
Swelling that gets worse/Wound/Surgical Site with redness, or foul smelling discharge or pus/Numbness, tingling, color or temperature change to extremity/Bleeding that does not stop/Pain not relieved by Medications/Inability to tolerate liquids or foods/Fever greater than (need to indicate Fahrenheit or Celsius)

## 2019-12-11 NOTE — PRE-ANESTHESIA EVALUATION ADULT - NSANTHPEFT_GEN_ALL_CORE
GENERAL: NAD  HEAD:  Atraumatic, Normocephalic  NECK: Right neck mass, does not extend to midline.  Trachea palpated easily and appears midline  CHEST/LUNG: Clear to auscultation bilaterally  HEART: Normal S1/S2  PSYCH: AAOx3  NEUROLOGY: non-focal  SKIN: No obvious rashes or lesions

## 2019-12-11 NOTE — ASU DISCHARGE PLAN (ADULT/PEDIATRIC) - CARE PROVIDER_API CALL
Imtiaz Gomez (MD)  Otolaryngology  [address] [place] [address], [address]  [place] [address]  Phone: [phone]  Fax: [phone]  Established Patient  Scheduled Appointment: 12/12/2019 09:45 AM Imtiaz Gomez)  Otolaryngology  [address] [place] [address], [address]  [place] [address]  Phone: [phone]  Fax: [phone]  Established Patient  Follow Up Time: 1 week    Guy Tavares)  Otolaryngology  [address] [facility], [address]  [place] [address]  Phone: [phone]  Fax: [phone]  Established Patient  Scheduled Appointment: 12/12/2019 10:00 AM

## 2019-12-12 ENCOUNTER — APPOINTMENT (OUTPATIENT)
Dept: OTOLARYNGOLOGY | Facility: CLINIC | Age: 49
End: 2019-12-12
Payer: MEDICARE

## 2019-12-12 VITALS
HEIGHT: 72 IN | SYSTOLIC BLOOD PRESSURE: 97 MMHG | BODY MASS INDEX: 23.7 KG/M2 | WEIGHT: 175 LBS | HEART RATE: 105 BPM | DIASTOLIC BLOOD PRESSURE: 64 MMHG

## 2019-12-12 PROBLEM — M48.50XA COLLAPSED VERTEBRA, NOT ELSEWHERE CLASSIFIED, SITE UNSPECIFIED, INITIAL ENCOUNTER FOR FRACTURE: Chronic | Status: ACTIVE | Noted: 2019-11-29

## 2019-12-12 PROBLEM — L03.90 CELLULITIS, UNSPECIFIED: Chronic | Status: ACTIVE | Noted: 2019-11-29

## 2019-12-12 PROBLEM — Q18.0 SINUS, FISTULA AND CYST OF BRANCHIAL CLEFT: Chronic | Status: ACTIVE | Noted: 2019-11-29

## 2019-12-12 PROCEDURE — 99024 POSTOP FOLLOW-UP VISIT: CPT

## 2019-12-12 NOTE — PHYSICAL EXAM
[Normal] : normal appearance, well groomed, well nourished, and in no acute distress [de-identified] : rt neck minimal swelling drain removed

## 2019-12-12 NOTE — REASON FOR VISIT
[Post-Operative Visit] : a post-operative visit [Mass Location ___] : a mass located in the [unfilled] [FreeTextEntry2] : post op visit/removal of drain

## 2019-12-18 LAB — SURGICAL PATHOLOGY STUDY: SIGNIFICANT CHANGE UP

## 2019-12-19 ENCOUNTER — APPOINTMENT (OUTPATIENT)
Dept: OTOLARYNGOLOGY | Facility: CLINIC | Age: 49
End: 2019-12-19
Payer: MEDICARE

## 2019-12-19 VITALS
HEART RATE: 101 BPM | HEIGHT: 72 IN | BODY MASS INDEX: 23.7 KG/M2 | WEIGHT: 175 LBS | SYSTOLIC BLOOD PRESSURE: 120 MMHG | DIASTOLIC BLOOD PRESSURE: 71 MMHG

## 2019-12-19 DIAGNOSIS — H61.21 IMPACTED CERUMEN, RIGHT EAR: ICD-10-CM

## 2019-12-19 DIAGNOSIS — Q18.0 SINUS, FISTULA AND CYST OF BRANCHIAL CLEFT: ICD-10-CM

## 2019-12-19 PROCEDURE — 99024 POSTOP FOLLOW-UP VISIT: CPT

## 2019-12-19 PROCEDURE — 69210 REMOVE IMPACTED EAR WAX UNI: CPT | Mod: 79

## 2019-12-20 NOTE — CONSULT LETTER
[FreeTextEntry1] : Dear Dr. Tavares, \par I had the pleasure of evaluating your patient MAGGI COBIAN  thank you for allowing us to participate in their care. please see full note detailing our visit below.\par If you have any questions, please do not hesitate to call me and I would be happy to discuss further. \par \par Imtiaz Gomez M.D.\par Attending Physician,  \par Department of Otolaryngology - Head and Neck Surgery\par [facility] \par Office: [phone]\par Fax: [phone]\par

## 2019-12-20 NOTE — ASSESSMENT
[FreeTextEntry1] : Pt with likely likely right branchial cleft cyst with large swelling now s/p resection of branchial cyst- right 12/11/19\par - Doing well post op\par - Steri-strips in place- incision healing well\par - Advised pt to apply Bacitracin or Aquaphor to incision BID\par \par Right CI\par - Cleared\par - Ear hygiene\par - Discussed preventive measures and signs of accumulation\par \par \par I personally saw and examined MAGGI COBIAN in detail. I spoke to BREANNE Ferrer regarding the assessment and plan of care.  I preformed the procedures and I reviewed the above assessment and plan of care, and agree. I have made changes in changes in the body of the note where appropriate.\par \par \par

## 2019-12-20 NOTE — HISTORY OF PRESENT ILLNESS
[de-identified] : S/p resection of branchial cyst- right 12/11/19\par Doing well post-op. Drain removed with Dr. Tavares on 12/12/19.\par Neck and throat pain improving- still with some right sided throat pain with swallowing. \par Has been keeping up with wound care/dressing changes. \par Drainage stopped completely by early this week. Completing abx course. \par Pathology- branchial cleft cyst\par No fever or chills.\par No SOB or difficulty swallowing.

## 2019-12-20 NOTE — PROCEDURE
[FreeTextEntry3] : Procedure - Cerumen Removal. \par After informed verbal consent is obtained, cerumen is removed from the right ear canal with curette.  Normal appearing canal following removal.\par  [de-identified] : Procedure performed: laryngeal Endoscopy- Diagnostic\par Pre-op/post op indication: dysphonia\par Verbal and/or written consent obtained from patient, Patient was unable to cooperate with mirror\par Scope #: 19, flexible fiber optic telescope used \par Scope was introduced through the nose passed on the floor of the nose to the nasopharynx and then followed down the soft palate to the lower pharynx. The tongue Base, Larynx, Hypopharynx were examined. Base of tongue was symmetric, vallecular was clear, epiglottis was not deformed, subglottis/ pyriform and posterior pharyngeal walls were clear. No erythema, edema, pooling of secretions, masses or lesions. Airway patent, no foreign body visualized. No glottic/supraglottic edema. VF clear arytenoids, vestibular folds, ventricles, pyriform sinuses, and aryepiglottic folds appear normal bilaterally. Vocal cords mobile with good contact b/l.\par \par

## 2019-12-20 NOTE — PHYSICAL EXAM
[Normal] : mucosa is normal [de-identified] : Steri strips in place- incision healing well [de-identified] : AD: CI

## 2023-02-08 NOTE — ED ADULT NURSE NOTE - OBJECTIVE STATEMENT
PT comes in for new left foot pain/redness/swelling that started yesterday, denies any recent travel/fever/chills. does not believe he was bit by anything. pt has chronic back pain and takes gabapentin and morphine daily. pt was able to walk in with cane. Subsequent Stages Histo Method Verbiage: Using a similar technique to that described above, a thin layer of tissue was removed from all areas where tumor was visible on the previous stage.  The tissue was again oriented, mapped, dyed, and processed as above.

## 2023-06-14 NOTE — PATIENT PROFILE ADULT - BRADEN MOISTURE
Detail Level: Detailed Add 32827 Cpt? (Important Note: In 2017 The Use Of 39902 Is Being Tracked By Cms To Determine Future Global Period Reimbursement For Global Periods): no (4) rarely moist

## 2024-09-18 NOTE — PATIENT PROFILE ADULT - NSPRESCRALCFREQ_GEN_A_NUR
Have we ever prescribed this med? Yes.  If yes, what date? 11/29/23    Last OV: 04/17/24 with Dr Kam    Next OV: 10/30/24 with Dr Kam     DX: COPD with asthma    Medications:   Requested Prescriptions     Pending Prescriptions Disp Refills    montelukast (SINGULAIR) 10 MG Tab [Pharmacy Med Name: Montelukast Sodium Oral Tablet 10 MG] 90 Tablet 3     Sig: TAKE 1 TABLET EVERY EVENING        Never